# Patient Record
Sex: FEMALE | Race: WHITE | URBAN - METROPOLITAN AREA
[De-identification: names, ages, dates, MRNs, and addresses within clinical notes are randomized per-mention and may not be internally consistent; named-entity substitution may affect disease eponyms.]

---

## 2017-06-21 LAB — POC BETA-HCG (QUAL): POSITIVE

## 2017-07-19 ENCOUNTER — HISTORICAL (OUTPATIENT)
Dept: ADMINISTRATIVE | Facility: HOSPITAL | Age: 27
End: 2017-07-19

## 2017-07-19 LAB
ABS NEUT (OLG): 6.15 X10(3)/MCL (ref 2.1–9.2)
BASOPHILS # BLD AUTO: 0.05 X10(3)/MCL
BASOPHILS NFR BLD AUTO: 1 % (ref 0–1)
BILIRUB SERPL-MCNC: NEGATIVE MG/DL
BLOOD URINE, POC: NEGATIVE
BUN SERPL-MCNC: 9 MG/DL (ref 7–18)
CALCIUM SERPL-MCNC: 8.7 MG/DL (ref 8.5–10.1)
CHLORIDE SERPL-SCNC: 104 MMOL/L (ref 98–107)
CLARITY, POC UA: CLEAR
CO2 SERPL-SCNC: 26 MMOL/L (ref 21–32)
COLOR, POC UA: YELLOW
CREAT SERPL-MCNC: 0.5 MG/DL (ref 0.6–1.3)
EOSINOPHIL # BLD AUTO: 0.04 X10(3)/MCL
EOSINOPHIL NFR BLD AUTO: 0 % (ref 0–5)
ERYTHROCYTE [DISTWIDTH] IN BLOOD BY AUTOMATED COUNT: 12.5 % (ref 11.5–14.5)
GLUCOSE SERPL-MCNC: 78 MG/DL (ref 74–106)
GLUCOSE UR QL STRIP: NEGATIVE
GROUP & RH: NORMAL
HBV SURFACE AG SERPL QL IA: NEGATIVE
HCT VFR BLD AUTO: 36 % (ref 35–46)
HCV AB SERPL QL IA: NONREACTIVE
HGB BLD-MCNC: 12.1 GM/DL (ref 12–16)
HIV 1+2 AB+HIV1 P24 AG SERPL QL IA: NONREACTIVE
IMM GRANULOCYTES # BLD AUTO: 0.03 10*3/UL
IMM GRANULOCYTES NFR BLD AUTO: 0 %
KETONES UR QL STRIP: NEGATIVE
LEUKOCYTE EST, POC UA: NEGATIVE
LYMPHOCYTES # BLD AUTO: 1.95 X10(3)/MCL
LYMPHOCYTES NFR BLD AUTO: 22 % (ref 15–40)
MCH RBC QN AUTO: 30.1 PG (ref 26–34)
MCHC RBC AUTO-ENTMCNC: 33.6 GM/DL (ref 31–37)
MCV RBC AUTO: 89.6 FL (ref 80–100)
MONOCYTES # BLD AUTO: 0.6 X10(3)/MCL
MONOCYTES NFR BLD AUTO: 7 % (ref 4–12)
NEUTROPHILS # BLD AUTO: 6.15 X10(3)/MCL
NEUTROPHILS NFR BLD AUTO: 70 X10(3)/MCL
NITRITE, POC UA: NEGATIVE
PH, POC UA: 6.5
PLATELET # BLD AUTO: 239 X10(3)/MCL (ref 130–400)
PMV BLD AUTO: 10.2 FL (ref 7.4–10.4)
POTASSIUM SERPL-SCNC: 3.5 MMOL/L (ref 3.5–5.1)
PROTEIN, POC: NEGATIVE
RBC # BLD AUTO: 4.02 X10(6)/MCL (ref 4–5.2)
RPR SER QL: NON REACTIVE
SODIUM SERPL-SCNC: 137 MMOL/L (ref 136–145)
SPECIFIC GRAVITY, POC UA: 1.01
UROBILINOGEN, POC UA: NORMAL
WBC # SPEC AUTO: 8.8 X10(3)/MCL (ref 4.5–11)

## 2017-07-21 LAB — FINAL CULTURE: NO GROWTH

## 2017-08-18 LAB
BILIRUB SERPL-MCNC: NEGATIVE MG/DL
BLOOD URINE, POC: NEGATIVE
CLARITY, POC UA: CLEAR
COLOR, POC UA: NORMAL
GLUCOSE UR QL STRIP: NEGATIVE
KETONES UR QL STRIP: NEGATIVE
LEUKOCYTE EST, POC UA: NEGATIVE
NITRITE, POC UA: NEGATIVE
PH, POC UA: 5
PROTEIN, POC: NEGATIVE
SPECIFIC GRAVITY, POC UA: 1.01
UROBILINOGEN, POC UA: NORMAL

## 2017-09-15 ENCOUNTER — HISTORICAL (OUTPATIENT)
Dept: FAMILY MEDICINE | Facility: CLINIC | Age: 27
End: 2017-09-15

## 2017-09-15 LAB
BILIRUB SERPL-MCNC: NEGATIVE MG/DL
BLOOD URINE, POC: NEGATIVE
CLARITY, POC UA: CLEAR
COLOR, POC UA: NORMAL
GLUCOSE UR QL STRIP: NEGATIVE
KETONES UR QL STRIP: NEGATIVE
LEUKOCYTE EST, POC UA: NEGATIVE
NITRITE, POC UA: NEGATIVE
PH, POC UA: 6.5
PROTEIN, POC: NORMAL
SPECIFIC GRAVITY, POC UA: 1.02
UROBILINOGEN, POC UA: NORMAL

## 2017-10-13 LAB
BILIRUB SERPL-MCNC: NEGATIVE MG/DL
BLOOD URINE, POC: NEGATIVE
CLARITY, POC UA: CLEAR
COLOR, POC UA: YELLOW
GLUCOSE UR QL STRIP: NEGATIVE
KETONES UR QL STRIP: NEGATIVE
LEUKOCYTE EST, POC UA: NEGATIVE
NITRITE, POC UA: NEGATIVE
PH, POC UA: 6.5
PROTEIN, POC: NEGATIVE
SPECIFIC GRAVITY, POC UA: 1.01
UROBILINOGEN, POC UA: NORMAL

## 2017-11-09 LAB
BILIRUB SERPL-MCNC: NEGATIVE MG/DL
BLOOD URINE, POC: NEGATIVE
CLARITY, POC UA: CLEAR
COLOR, POC UA: NORMAL
GLUCOSE UR QL STRIP: NEGATIVE
KETONES UR QL STRIP: NEGATIVE
LEUKOCYTE EST, POC UA: NEGATIVE
NITRITE, POC UA: NEGATIVE
PH, POC UA: 7
PROTEIN, POC: NEGATIVE
SPECIFIC GRAVITY, POC UA: 1.01
UROBILINOGEN, POC UA: NORMAL

## 2017-12-05 ENCOUNTER — HISTORICAL (OUTPATIENT)
Dept: ADMINISTRATIVE | Facility: HOSPITAL | Age: 27
End: 2017-12-05

## 2017-12-05 LAB
ABS NEUT (OLG): 5.74 X10(3)/MCL (ref 2.1–9.2)
BASOPHILS # BLD AUTO: 0.02 X10(3)/MCL
BASOPHILS NFR BLD AUTO: 0 % (ref 0–1)
BILIRUB SERPL-MCNC: NEGATIVE MG/DL
BLOOD URINE, POC: NEGATIVE
CLARITY, POC UA: CLEAR
COLOR, POC UA: YELLOW
EOSINOPHIL # BLD AUTO: 0.06 X10(3)/MCL
EOSINOPHIL NFR BLD AUTO: 1 % (ref 0–5)
ERYTHROCYTE [DISTWIDTH] IN BLOOD BY AUTOMATED COUNT: 11.9 % (ref 11.5–14.5)
GLUCOSE 1H P 100 G GLC PO SERPL-MCNC: 102 MG/DL
GLUCOSE UR QL STRIP: NEGATIVE
HCT VFR BLD AUTO: 33.1 % (ref 35–46)
HGB BLD-MCNC: 11.2 GM/DL (ref 12–16)
IMM GRANULOCYTES # BLD AUTO: 0.03 10*3/UL
IMM GRANULOCYTES NFR BLD AUTO: 0 %
KETONES UR QL STRIP: NORMAL
LEUKOCYTE EST, POC UA: NEGATIVE
LYMPHOCYTES # BLD AUTO: 1.53 X10(3)/MCL
LYMPHOCYTES NFR BLD AUTO: 19 % (ref 15–40)
MCH RBC QN AUTO: 31.5 PG (ref 26–34)
MCHC RBC AUTO-ENTMCNC: 33.8 GM/DL (ref 31–37)
MCV RBC AUTO: 93.2 FL (ref 80–100)
MONOCYTES # BLD AUTO: 0.66 X10(3)/MCL
MONOCYTES NFR BLD AUTO: 8 % (ref 4–12)
NEUTROPHILS # BLD AUTO: 5.74 X10(3)/MCL
NEUTROPHILS NFR BLD AUTO: 72 X10(3)/MCL
NITRITE, POC UA: NEGATIVE
PH, POC UA: 6.5
PLATELET # BLD AUTO: 204 X10(3)/MCL (ref 130–400)
PMV BLD AUTO: 10.3 FL (ref 7.4–10.4)
PROTEIN, POC: NORMAL
RBC # BLD AUTO: 3.55 X10(6)/MCL (ref 4–5.2)
SPECIFIC GRAVITY, POC UA: 1.01
T PALLIDUM AB SER QL: NONREACTIVE
UROBILINOGEN, POC UA: NORMAL
WBC # SPEC AUTO: 8 X10(3)/MCL (ref 4.5–11)

## 2018-01-03 LAB
BILIRUB SERPL-MCNC: NEGATIVE MG/DL
BLOOD URINE, POC: NEGATIVE
CLARITY, POC UA: CLEAR
COLOR, POC UA: YELLOW
GLUCOSE UR QL STRIP: NEGATIVE
KETONES UR QL STRIP: NEGATIVE
LEUKOCYTE EST, POC UA: NEGATIVE
NITRITE, POC UA: NEGATIVE
PH, POC UA: 7
PROTEIN, POC: NEGATIVE
SPECIFIC GRAVITY, POC UA: 1
UROBILINOGEN, POC UA: NORMAL

## 2018-01-31 ENCOUNTER — HISTORICAL (OUTPATIENT)
Dept: ADMINISTRATIVE | Facility: HOSPITAL | Age: 28
End: 2018-01-31

## 2018-01-31 LAB
ABS NEUT (OLG): 6.26 X10(3)/MCL (ref 2.1–9.2)
BASOPHILS # BLD AUTO: 0.04 X10(3)/MCL
BASOPHILS NFR BLD AUTO: 0 % (ref 0–1)
BILIRUB SERPL-MCNC: NEGATIVE MG/DL
BLOOD URINE, POC: NEGATIVE
CLARITY, POC UA: CLEAR
COLOR, POC UA: YELLOW
EOSINOPHIL # BLD AUTO: 0.08 10*3/UL
EOSINOPHIL NFR BLD AUTO: 1 % (ref 0–5)
ERYTHROCYTE [DISTWIDTH] IN BLOOD BY AUTOMATED COUNT: 12.2 % (ref 11.5–14.5)
GLUCOSE UR QL STRIP: NEGATIVE
HCT VFR BLD AUTO: 34.1 % (ref 35–46)
HGB BLD-MCNC: 11.4 GM/DL (ref 12–16)
HIV 1+2 AB+HIV1 P24 AG SERPL QL IA: NONREACTIVE
IMM GRANULOCYTES # BLD AUTO: 0.09 10*3/UL
IMM GRANULOCYTES NFR BLD AUTO: 1 %
KETONES UR QL STRIP: NEGATIVE
LEUKOCYTE EST, POC UA: NEGATIVE
LYMPHOCYTES # BLD AUTO: 1.86 X10(3)/MCL
LYMPHOCYTES NFR BLD AUTO: 20 % (ref 15–40)
MCH RBC QN AUTO: 30.6 PG (ref 26–34)
MCHC RBC AUTO-ENTMCNC: 33.4 GM/DL (ref 31–37)
MCV RBC AUTO: 91.7 FL (ref 80–100)
MONOCYTES # BLD AUTO: 0.93 X10(3)/MCL
MONOCYTES NFR BLD AUTO: 10 % (ref 4–12)
NEUTROPHILS # BLD AUTO: 6.26 X10(3)/MCL
NEUTROPHILS NFR BLD AUTO: 68 X10(3)/MCL
NITRITE, POC UA: NEGATIVE
PH, POC UA: 6.5
PLATELET # BLD AUTO: 223 X10(3)/MCL (ref 130–400)
PMV BLD AUTO: 10.7 FL (ref 7.4–10.4)
PROTEIN, POC: NEGATIVE
RBC # BLD AUTO: 3.72 X10(6)/MCL (ref 4–5.2)
SPECIFIC GRAVITY, POC UA: 1.02
T PALLIDUM AB SER QL: NONREACTIVE
UROBILINOGEN, POC UA: NORMAL
WBC # SPEC AUTO: 9.3 X10(3)/MCL (ref 4.5–11)

## 2018-02-02 LAB — FINAL CULTURE: NORMAL

## 2018-02-12 LAB
BILIRUB SERPL-MCNC: NEGATIVE MG/DL
BLOOD URINE, POC: NEGATIVE
CLARITY, POC UA: NORMAL
COLOR, POC UA: NORMAL
GLUCOSE UR QL STRIP: NEGATIVE
KETONES UR QL STRIP: NEGATIVE
LEUKOCYTE EST, POC UA: NORMAL
NITRITE, POC UA: NEGATIVE
PH, POC UA: 6.5
PROTEIN, POC: NEGATIVE
SPECIFIC GRAVITY, POC UA: 1.01
UROBILINOGEN, POC UA: NORMAL

## 2018-02-19 LAB
BILIRUB SERPL-MCNC: NORMAL MG/DL
BLOOD URINE, POC: NEGATIVE
CLARITY, POC UA: CLEAR
COLOR, POC UA: NORMAL
GLUCOSE UR QL STRIP: NEGATIVE
KETONES UR QL STRIP: NEGATIVE
LEUKOCYTE EST, POC UA: NORMAL
NITRITE, POC UA: NEGATIVE
PH, POC UA: 7
PROTEIN, POC: NORMAL
SPECIFIC GRAVITY, POC UA: 1.01
UROBILINOGEN, POC UA: NORMAL

## 2019-08-26 ENCOUNTER — HISTORICAL (OUTPATIENT)
Dept: LAB | Facility: HOSPITAL | Age: 29
End: 2019-08-26

## 2019-08-26 LAB
BILIRUB SERPL-MCNC: NEGATIVE MG/DL
BLOOD URINE, POC: NORMAL
CLARITY, POC UA: CLEAR
COLOR, POC UA: YELLOW
GLUCOSE UR QL STRIP: NEGATIVE
KETONES UR QL STRIP: NEGATIVE
LEUKOCYTE EST, POC UA: NEGATIVE
NITRITE, POC UA: NEGATIVE
PH, POC UA: 7
PROTEIN, POC: NEGATIVE
SPECIFIC GRAVITY, POC UA: 1.01
UROBILINOGEN, POC UA: NORMAL

## 2019-08-28 LAB — FINAL CULTURE: NO GROWTH

## 2020-01-15 LAB
B-HCG FREE SERPL-ACNC: <1 MIU/ML
BUN SERPL-MCNC: 9 MG/DL (ref 7–18)
CALCIUM SERPL-MCNC: 9.2 MG/DL (ref 8.5–10.1)
CHLORIDE SERPL-SCNC: 106 MMOL/L (ref 98–107)
CO2 SERPL-SCNC: 28 MMOL/L (ref 21–32)
CREAT SERPL-MCNC: 0.66 MG/DL (ref 0.55–1.02)
CREAT/UREA NIT SERPL: 13.6
ERYTHROCYTE [DISTWIDTH] IN BLOOD BY AUTOMATED COUNT: 12.3 % (ref 11.5–17)
GLUCOSE SERPL-MCNC: 85 MG/DL (ref 74–106)
HCT VFR BLD AUTO: 39.8 % (ref 37–47)
HGB BLD-MCNC: 12.4 GM/DL (ref 12–16)
MCH RBC QN AUTO: 29.5 PG (ref 27–31)
MCHC RBC AUTO-ENTMCNC: 31.2 GM/DL (ref 33–36)
MCV RBC AUTO: 94.8 FL (ref 80–94)
PLATELET # BLD AUTO: 242 X10(3)/MCL (ref 130–400)
PMV BLD AUTO: 10.2 FL (ref 9.4–12.4)
POTASSIUM SERPL-SCNC: 4.1 MMOL/L (ref 3.5–5.1)
RBC # BLD AUTO: 4.2 X10(6)/MCL (ref 4.2–5.4)
SODIUM SERPL-SCNC: 141 MMOL/L (ref 136–145)
WBC # SPEC AUTO: 5 X10(3)/MCL (ref 4.5–11.5)

## 2020-01-16 ENCOUNTER — HISTORICAL (OUTPATIENT)
Dept: ADMINISTRATIVE | Facility: HOSPITAL | Age: 30
End: 2020-01-16

## 2020-01-16 LAB — B-HCG FREE SERPL-ACNC: <1 MIU/ML

## 2020-05-06 ENCOUNTER — HISTORICAL (OUTPATIENT)
Dept: ADMINISTRATIVE | Facility: HOSPITAL | Age: 30
End: 2020-05-06

## 2022-04-10 ENCOUNTER — HISTORICAL (OUTPATIENT)
Dept: ADMINISTRATIVE | Facility: HOSPITAL | Age: 32
End: 2022-04-10

## 2022-04-26 VITALS
SYSTOLIC BLOOD PRESSURE: 122 MMHG | OXYGEN SATURATION: 100 % | DIASTOLIC BLOOD PRESSURE: 75 MMHG | HEIGHT: 61 IN | WEIGHT: 125.88 LBS | BODY MASS INDEX: 23.77 KG/M2

## 2022-04-30 NOTE — PROGRESS NOTES
Patient:   Kelsey Macias             MRN: [id number]            FIN: [id number]               Age:   27 years     Sex:  Female     :  1990   Associated Diagnoses:   None   Author:   Yobany Chavez MD      Chief Complaint      History of Present Illness   28 yo  WF 9 3/7 WGA by 1st trimester ultrasound with assigned EDC of 18  presents to [facility] Initial OB clinic.     Today: No complaints today. Takes excedrin without asa for chronic HA. No recent travel. Taking PNVs.    Chronic Issues: migraines      OB Review of Systems:  Fetal movements: denies  Vaginal bleeding: denies  Vaginal discharge: denies  Loss of fluid: denies  Contractions: denies  Headaches: controlled on Excedrin  Vision changes: denies  Edema: denies    Gestational History:   - G1: current pregnancy  GYN History: LMP: 5/15/17, Age at menarche: 13yo, Menstrual hx: periods regular 28 day interval, previously on OCPs, denies h/o STDs, denies h/o abnormal pap smears, never had a pap  Past Medical History: migraines  Surgical History: wisdom teeth removed  Social History: Denies tobacco, alcohol, or drug usage.  Medications: Excedrin  Family History: mother had GDM and stillbirth, pgf with dm, father with htn      Review of Systems   Constitutional:  No fever, No chills, No fatigue.    Ear/Nose/Mouth/Throat:  No nasal congestion, No sore throat.    Respiratory:  No shortness of breath, No cough, No wheezing.    Cardiovascular:  No chest pain, No palpitations, No tachycardia, No peripheral edema.    Gastrointestinal:  No nausea, No vomiting, No diarrhea, No constipation.    Genitourinary:  No dysuria, No hematuria, No lesions.    Musculoskeletal:  No joint pain, No muscle pain.    Integumentary:  No rash, No breakdown, No skin lesion.    Neurologic:  No headache.    Psychiatric:  Negative.       Health Status   Allergies:    Allergic Reactions (Selected)  No Known Allergies,    Allergies (1) Active Reaction  No Known Allergies None  Documented     Current medications:  (Selected)   Documented Medications  Documented  Excedrin Tension Headache Express 500 mg-65 mg oral tablet: 2 tab(s), Oral, q6hr, prn, 0 Refill(s)  Prenatal Multivitamins: Oral, Daily, 0 Refill(s),    Home Medications (2) Active  Excedrin Tension Headache Express 500 mg-65 mg oral tablet 2 tab(s), Oral, q6hr  Prenatal Multivitamins , Oral, Daily        Histories   Past Medical History:    No active or resolved past medical history items have been selected or recorded.   Family History:    Gestational diabetes  Mother  High blood pressure                                                                                                                                                                                                                     09-AUG-2016 15:25:48<$>  Father     Procedure history:    No active procedure history items have been selected or recorded.   Social History        Social & Psychosocial Habits    Alcohol  07/19/2017  Use: Never    Substance Abuse  07/19/2017  Use: Never    Tobacco  07/19/2017  Use: Never smoker  .        Physical Examination   Vital Signs   7/19/2017 10:28 CDT      Temperature Oral          36.9 DegC                             Peripheral Pulse Rate     63 bpm                             Respiratory Rate          20 br/min                             SpO2                      99 %                             Systolic Blood Pressure   117 mmHg                             Diastolic Blood Pressure  66 mmHg     General:  No acute distress.    Respiratory:  Lungs are clear to auscultation, Respirations are non-labored, Breath sounds are equal, Symmetrical chest wall expansion.    Cardiovascular:  Normal rate, Regular rhythm, Good pulses equal in all extremities, No edema, No murmurs appreciated.    Gastrointestinal:  Soft, Non-tender, Normal bowel sounds, gravid.    Obstetric Exam     Uterus: consistent with gestational age.     Cervix:  cervical exam deferred.     Integumentary:  Warm, Dry.    Neurologic:  Alert, Oriented, no focal motor or neurological deficits appreciated.    Cognition and Speech:  Oriented, Speech clear and coherent, Functional cognition intact.    Psychiatric:  Cooperative, Appropriate mood & affect, Normal judgment.       Health Maintenance      Health Maintenance     Pending (in the next year)        Due            Alcohol Misuse Screening due  07/19/17  and every 1  year(s)           Cervical Cancer Screening due  07/19/17  Variable frequency           Depression Screening due  07/19/17  and every 1  year(s)           HIV Screening due  07/19/17  and every 1  year(s)           Tetanus Vaccine due  07/19/17  and every 10  year(s)        Due In Future            Influenza Vaccine not due until  10/02/17  and every 1  year(s)     Satisfied (in the past 1 year)        Satisfied            Blood Pressure Screening on  07/19/17.  Satisfied by Danita Rodriguez LPN           Body Mass Index Check on  07/19/17.  Satisfied by Danita Rodriguez LPN           Obesity Screening on  07/19/17.  Satisfied by Danita Rodriguez LPN          Review / Management     Ultrasound:   Initial US 7/17/17 at 9 3/7 WGA   Impression: IUP    Inital OB Labs:  Blood Type and Rh: _  Antibody Screen: _  CBC H/H: _  HIV: _  RPR: _  GC: _  CT: _  HBsAg: _  HCVAb: _  Rubella: _  Varicella: _  UA & Culture: _  Sickle Cell Screen: _  PAP: _  Influenza vaccine date: _    15-20 Weeks Lab  Quad Screen: _    28 Week Lab  1H GTT: _  Date of Rhogam if indicated: _  Date of Tdap: _  CBC H/H: _  RPR: _    36 Week Lab  CBC H/H: _  RPR: _  GBS Culture: _  HIV: _  Urine: GC: _  Urine: CT: _    Results review:  Lab results   7/19/2017 10:22 CDT      Urine Color Urine Dipstick                Yellow                             Urine Appearance Urine Dipstick           Clear                             pH Urine Dipstick         6.5                             Specific  Gravity Urine Dipstick           1.010                             Blood Urine Dipstick      Negative                             Glucose Urine Dipstick    Negative                             Ketones Urine Dipstick    Negative                             Protein Urine Dipstick    Negative                             Bilirubin Urine Dipstick  Negative                             Urobilinogen Urine Dipstick               0.2 mg/dl                             Leukocytes Urine Dipstick Negative                             Nitrite Urine Dipstick    Negative  .       Impression and Plan   Diagnosis     AP: 28 yo   WF @ 9 3 WGA  1. IUP   - Continue PNVs, patient taking OTC   - Initial OB labs ordered   -pap done today with HPV probe   - Desires OCPs for postpartum contraception   - Labor precautions given    2. Chronic migraine   -controlled   -okay to continue excedrin without asa    Return to clinic 4 weeks, assign to FM resident

## 2022-04-30 NOTE — OP NOTE
Patient:   Kelsey Fierro            MRN: [id number]            FIN: [id number]               Age:   29 years     Sex:  Female     :  1990   Associated Diagnoses:   None   Author:   Yesenia AGUILAR, Aniceto LUCERO      Operative Note   Preoperative diagnosis:    1. pelvic pain  2. suspected endometriosis        Postoperative diagnosis:      1. moderate (stage II) endometriosis  2. paratubal cysts  2. intestinal adhesions (altered surgical field)      Surgeon:   Dr. Patterson    Operation(s):    1. robot-assisted laparoscopic lysis of adhesions  2. robot assisted excision of endometriosis  3. bilateral tubal cystectomies      Anesthesia: General endotracheal anesthesia    Findings:  Diagnostic laparoscopy revealed extensive intestinal and omental adhesions involving the sigmoid colon, left abdominal wall, the omentum, the anterior abdominal wall, and the uterine fundus.  Additionally there were  endometriosis lesions involving the left uterosacral ligament, the right ovarian fossa, the right uterosacral ligament, the posterior cul-de-sac, the posterior cervix, the posterior aspect of the left broad ligament, and the surface of each ovary.    There were 2 para-tubal cysts involving the right oviduct and 1 para-tubal cyst involving the left.  The bladder and anterior pelvic wall was densely adherent to the anterior body and lower-uterine segment of the uterus.    Specimens:  1. posterior cul-de-sac  2. posterior cervix  3. right uterosacral ligament  4. right ovarian fossa  5. right tubal cyst  6. left tubal cyst  7.  left uterosacral ligament    Procedure:    After informed consent and negative hCG was verified patient was taken to the operating room with IV fluid running. She was then administered general endotracheal anesthesia, and prepped and draped in low lithotomy position using stirrups. The patient's arms were tucked at her sides. A Arteaga catheter was placed into the bladder. The 8cm YO tip was  assembled which was then placed into the uterine cavity with the assistance of a weighted speculum and tenaculum. Attention was then turned towards the abdomen where the base of the umbilicus was anesthetized with half percent Marcaine with epinephrine.   A 8 mm vertical skin incision was made with scalpel at the base of the umbilicus.  A Varese needle and placed in the intraperitoneal cavity. Intraperitoneal placement confirmed by instillation of normal saline and ball test.  The peritoneum was then obtained with CO2 opening pressure that was noted to be 7 mmHg. Pressure was initially set at 20 mm of mercury until all 3 trocars were placed. After placement of all trocars the pressure was reduced to 12mmHg for the remainder of the case.  A robotic trocar was placed in the right lower quadrant through an anesthetized 8 mm skin incision at a point approximately  8 cm lateral in a 10° caudad direction from the umbilical incision.  An additional accessory trocar was then placed in the left lower quadrant immediately symmetrical to the right lower quadrant port also an anesthetized 8 mm skin incision under laparoscopic visualization.    At this point the patient was placed in steep Trendelenburg. The bowel was swept out of the posterior cul-de-sac to the degree possible with adhesions present. Next Trendelenburg was reduced to approximately 20°. At this time the robot was docked in the following fashion. The 8.5 mm camera was docked at the umbilical port. The right lower quadrant port was assembled with the monopolar scissors using the arm #1. The left lower quadrant port degrees was assembled with the  bipolar forceps using arm #3. At this point the surgeon broke scrub and was seated at the console. Diagnostic laparoscopy was then performed with findings as described above.    First, extensive lysis of adhesions was performed using monopolar scissors.   Next the suspected areas of endometriosis were excised with a  combination of both sharp cutting and electrocautery with cut current. Care was taken to observe the course of both ureters prior and during peritoneal resections.  Several areas of superficial endometriosis on the surface of each ovary and along the posterior aspec to the left broad ligament were ablated with electrocautery cut current.  The tubal cysts were both excised with cautery.  A muscular defect of the anterior uterine body secondary to dense adhesions was closed with 3-0 Vicryl.    At this point the robot was undocked and the surgeon re-scrubbed.    After peritoneal surfaces were resected, granulated Surgicel was applied to further ensure hemostasis Next, a Seprafilm solution was applied through the laparoscope After hemostasis was confirmed the pelvis was irrigated with warm lactated Ringer's solution.   The patient was taken out of Trendelenburg. Pelvis was again inspected and noted to be hemostatic. Approximately 900mL of warm lactated Ringer's was left in the pelvic cavity the close of the case to both minimize postoperative adhesions and pain. Pneumoperitoneum was then evacuated. All trocars were removed from the abdomen. The skin incisions for all 3 trocar sites were then closed with 4-0 Monocryl in subcuticular fashion. The skin incisions were then sealed with Dermabond. The YO was then removed from the vagina, and the Arteaga catheter catheter was left in place of the case. Instrument sponge and needle counts were correct x2. The patient was extubated and returned to the recovery room awake and in stable condition.    Complications: None    EBL: 40 ml  UOP: 100ml  IV: 1800ml

## 2022-04-30 NOTE — PROGRESS NOTES
Patient:   Kelsey Macias             MRN: [id number]            FIN: [id number]               Age:   27 years     Sex:  Female     :  1990   Associated Diagnoses:   None   Author:   Mindy AGUILAR, John CEBALLOS      Physician: MINDY  Reason for Exam: INITIAL PRENATAL VISIT; DATING ULTRASOUND  : 1  Parity: 0  LMP:05/15/2017  Gestational Age: 9w2d  EDC: 2018    Examination:  MARIANNA: ADEQUATE  Fetal Tone: PRESENT  Fetal Movement: PRESENT  Fetal Heart Rate: 171  Fetus: SINGLE  Presentation: VARIABLE  Placenta:       Position: ANTERIOR       Grade:     Measurement:  CRL: 2.64cm  EGA: 9w3d  EDC: 2018    Comments: 1st trimester intrauterine pregnancy measuring 9w3d for EDC of 2018 consistent with menstraul date of 2018. FHR: 171. Viable intrauterine pregnancy.  ASSIGN EDC: 2018          This document has images

## 2022-09-21 ENCOUNTER — HISTORICAL (OUTPATIENT)
Dept: ADMINISTRATIVE | Facility: HOSPITAL | Age: 32
End: 2022-09-21